# Patient Record
Sex: FEMALE | Race: WHITE | Employment: FULL TIME | URBAN - METROPOLITAN AREA
[De-identification: names, ages, dates, MRNs, and addresses within clinical notes are randomized per-mention and may not be internally consistent; named-entity substitution may affect disease eponyms.]

---

## 2021-04-05 ENCOUNTER — THERAPY VISIT (OUTPATIENT)
Dept: PHYSICAL THERAPY | Facility: CLINIC | Age: 36
End: 2021-04-05
Payer: COMMERCIAL

## 2021-04-05 DIAGNOSIS — M25.572 PAIN IN JOINT, ANKLE AND FOOT, LEFT: Primary | ICD-10-CM

## 2021-04-05 PROCEDURE — 97535 SELF CARE MNGMENT TRAINING: CPT | Mod: GP | Performed by: PHYSICAL THERAPIST

## 2021-04-05 PROCEDURE — 97161 PT EVAL LOW COMPLEX 20 MIN: CPT | Mod: GP | Performed by: PHYSICAL THERAPIST

## 2021-04-05 PROCEDURE — 97110 THERAPEUTIC EXERCISES: CPT | Mod: GP | Performed by: PHYSICAL THERAPIST

## 2021-04-05 NOTE — PROGRESS NOTES
Physical Therapy Initial Evaluation  Subjective:  The history is provided by the patient. No  was used.   Therapist Generated HPI Evaluation  Problem details: Pt presents to PT with c/o L ankle stiffness and cracking s/p old ankle sprain and arthroscopic surgery in Oct 2019.    Pt described the surgery as a clean up, no repairs were done.   Pt did not have any prior physical therapy.    She also notes a small mass around her posterior lateral ankle.      Pt works in freelance marketing.    PMH: skin cancer R calf, depression, migraines, overweight, sleep apnea.         Type of problem:  Left ankle.    This is a chronic condition.  Condition occurred with:  A fall/slip and a twist.  Where condition occurred: at home.  Patient reports pain:  Joint.  Pain is described as aching and is intermittent.  Pain radiates to:  No radiation. Pain is the same all the time.  Since onset symptoms are unchanged.  Associated symptoms:  Loss of motion/stiffness, catching and loss of strength. Symptoms are exacerbated by bending/squatting, walking, descending stairs, ascending stairs and standing  and relieved by rest.  Special tests included:  X-ray.  Past treatment: none. Improved with treatment: n/a.                          Objective:  System    Ankle/Foot Evaluation  ROM:  AROM is normal.      Strength:      Plantarflexion: Left: 4/5   Pain:   Right: 5-/5  Pain:                  Strength wnl ankle: PF strength x15 reps 8/10 fatigue with rolling on LLE, next: more specific strength testing.            FUNCTIONAL TESTS: Functional test ankle: Squat limited L ankle DF, SLS mild unsteadiness LLE with STJ collaspe.                                                                  General Evaluation:                              Gait:  Gait wnl general: Pt amb without AD without obvious deviation                                            ROS    Assessment/Plan:    Patient is a 35 year old female with left side ankle  complaints.    Patient has the following significant findings with corresponding treatment plan.                Diagnosis 1:  Chronic L ankle pain, old ankle sprain  Pain -  hot/cold therapy  Decreased ROM/flexibility - manual therapy and therapeutic exercise  Decreased joint mobility - manual therapy and therapeutic exercise  Decreased strength - therapeutic exercise and therapeutic activities  Impaired balance - neuro re-education and therapeutic activities  Impaired gait - gait training  Impaired muscle performance - neuro re-education  Decreased function - therapeutic activities    Therapy Evaluation Codes:   1) History comprised of:   Personal factors that impact the plan of care:      None.    Comorbidity factors that impact the plan of care are:      None.     Medications impacting care: None.  2) Examination of Body Systems comprised of:   Body structures and functions that impact the plan of care:      Ankle.   Activity limitations that impact the plan of care are:      Squatting/kneeling, Stairs, Standing and Walking.  3) Clinical presentation characteristics are:   Stable/Uncomplicated.  4) Decision-Making    Low complexity using standardized patient assessment instrument and/or measureable assessment of functional outcome.  Cumulative Therapy Evaluation is: Low complexity.    Previous and current functional limitations:  (See Goal Flow Sheet for this information)    Short term and Long term goals: (See Goal Flow Sheet for this information)     Communication ability:  Patient appears to be able to clearly communicate and understand verbal and written communication and follow directions correctly.  Treatment Explanation - The following has been discussed with the patient:   RX ordered/plan of care  Anticipated outcomes  Possible risks and side effects  This patient would benefit from PT intervention to resume normal activities.   Rehab potential is good.    Frequency:  1 X week, once daily  Duration:  for  6 weeks  Discharge Plan:  Achieve all LTG.  Independent in home treatment program.  Return to previous functional level by discharge.  Reach maximal therapeutic benefit.    Please refer to the daily flowsheet for treatment today, total treatment time and time spent performing 1:1 timed codes.

## 2021-04-14 ENCOUNTER — THERAPY VISIT (OUTPATIENT)
Dept: PHYSICAL THERAPY | Facility: CLINIC | Age: 36
End: 2021-04-14
Payer: COMMERCIAL

## 2021-04-14 DIAGNOSIS — M25.572 PAIN IN JOINT, ANKLE AND FOOT, LEFT: Primary | ICD-10-CM

## 2021-04-14 PROCEDURE — 97112 NEUROMUSCULAR REEDUCATION: CPT | Mod: GP | Performed by: PHYSICAL THERAPIST

## 2021-04-14 PROCEDURE — 97530 THERAPEUTIC ACTIVITIES: CPT | Mod: GP | Performed by: PHYSICAL THERAPIST

## 2021-04-14 PROCEDURE — 97110 THERAPEUTIC EXERCISES: CPT | Mod: GP | Performed by: PHYSICAL THERAPIST

## 2021-04-28 ENCOUNTER — THERAPY VISIT (OUTPATIENT)
Dept: PHYSICAL THERAPY | Facility: CLINIC | Age: 36
End: 2021-04-28
Payer: COMMERCIAL

## 2021-04-28 DIAGNOSIS — M25.572 PAIN IN JOINT, ANKLE AND FOOT, LEFT: Primary | ICD-10-CM

## 2021-04-28 PROCEDURE — 97112 NEUROMUSCULAR REEDUCATION: CPT | Mod: GP | Performed by: PHYSICAL THERAPIST

## 2021-04-28 PROCEDURE — 97110 THERAPEUTIC EXERCISES: CPT | Mod: GP | Performed by: PHYSICAL THERAPIST

## 2021-05-12 ENCOUNTER — THERAPY VISIT (OUTPATIENT)
Dept: PHYSICAL THERAPY | Facility: CLINIC | Age: 36
End: 2021-05-12
Payer: COMMERCIAL

## 2021-05-12 DIAGNOSIS — M25.572 PAIN IN JOINT, ANKLE AND FOOT, LEFT: Primary | ICD-10-CM

## 2021-05-12 PROCEDURE — 97110 THERAPEUTIC EXERCISES: CPT | Mod: GP | Performed by: PHYSICAL THERAPIST

## 2021-05-12 PROCEDURE — 97112 NEUROMUSCULAR REEDUCATION: CPT | Mod: GP | Performed by: PHYSICAL THERAPIST

## 2021-05-12 PROCEDURE — 97530 THERAPEUTIC ACTIVITIES: CPT | Mod: GP | Performed by: PHYSICAL THERAPIST

## 2021-05-29 ENCOUNTER — RECORDS - HEALTHEAST (OUTPATIENT)
Dept: ADMINISTRATIVE | Facility: CLINIC | Age: 36
End: 2021-05-29

## 2021-05-31 ENCOUNTER — RECORDS - HEALTHEAST (OUTPATIENT)
Dept: ADMINISTRATIVE | Facility: CLINIC | Age: 36
End: 2021-05-31

## 2021-11-11 NOTE — PROGRESS NOTES
DISCHARGE SUMMARY    Tammy Mistry was seen 4 times for evaluation and treatment.  Patient did not return for further treatment and current status is unknown.  Due to short treatment duration, no objective or functional changes were made.  Please see goal flow sheet from episode noted date below and initial evaluation for further information.  Patient is discharged from therapy and therapy episode is resolved as of 11/11/21.      No linked episodes